# Patient Record
Sex: FEMALE | Race: WHITE | NOT HISPANIC OR LATINO | URBAN - METROPOLITAN AREA
[De-identification: names, ages, dates, MRNs, and addresses within clinical notes are randomized per-mention and may not be internally consistent; named-entity substitution may affect disease eponyms.]

---

## 2017-01-04 ENCOUNTER — OFFICE (OUTPATIENT)
Dept: URBAN - METROPOLITAN AREA CLINIC 11 | Facility: CLINIC | Age: 67
End: 2017-01-04
Payer: COMMERCIAL

## 2017-01-04 VITALS
DIASTOLIC BLOOD PRESSURE: 76 MMHG | HEART RATE: 83 BPM | RESPIRATION RATE: 16 BRPM | HEIGHT: 67 IN | WEIGHT: 281 LBS | SYSTOLIC BLOOD PRESSURE: 170 MMHG

## 2017-01-04 DIAGNOSIS — K76.0 FATTY (CHANGE OF) LIVER, NOT ELSEWHERE CLASSIFIED: ICD-10-CM

## 2017-01-04 DIAGNOSIS — K58.9 IRRITABLE BOWEL SYNDROME WITHOUT DIARRHEA: ICD-10-CM

## 2017-01-04 PROCEDURE — 99214 OFFICE O/P EST MOD 30 MIN: CPT

## 2017-01-04 PROCEDURE — G8427 DOCREV CUR MEDS BY ELIG CLIN: HCPCS

## 2017-01-04 RX ORDER — DICYCLOMINE HYDROCHLORIDE 10 MG/1
CAPSULE ORAL
Qty: 90 | Refills: 3 | Status: COMPLETED
End: 2021-06-07

## 2017-01-08 LAB
BASIC METABOLIC PANEL: CALCIUM TOTAL: 9.4 MG/DL (ref 8.5–10.5)
BASIC METABOLIC PANEL: CARBON DIOXIDE: 31 MEQ/L (ref 21–31)
BASIC METABOLIC PANEL: CHLORIDE: 101 MEQ/L (ref 96–106)
BASIC METABOLIC PANEL: CREATININE: 0.57 MG/DL — LOW (ref 0.6–1.3)
BASIC METABOLIC PANEL: GLUCOSE: 103 MG/DL — HIGH (ref 65–100)
BASIC METABOLIC PANEL: POTASSIUM: 4.3 MEQ/ML (ref 3.5–5.4)
BASIC METABOLIC PANEL: SODIUM: 143 MEQ/L (ref 135–145)
BASIC METABOLIC PANEL: UREA NITROGEN: 8 MG/DL (ref 8–23)
CBCI COMPLETE BLOOD COUNT W/ DIFF: ABS BASOPHILS: 0 K/UL (ref 0–0.3)
CBCI COMPLETE BLOOD COUNT W/ DIFF: ABS EOSINOPHILS: 0.2 K/UL (ref 0.05–0.5)
CBCI COMPLETE BLOOD COUNT W/ DIFF: ABS LYMPHOCYTES: 1.7 K/UL (ref 1–4)
CBCI COMPLETE BLOOD COUNT W/ DIFF: ABS MONOCYTES: 0.4 K/UL (ref 0.1–1.1)
CBCI COMPLETE BLOOD COUNT W/ DIFF: ABS NEUTROPHILS: 3.6 K/UL (ref 1.8–7)
CBCI COMPLETE BLOOD COUNT W/ DIFF: BASOPHILS: 0.2 % (ref 0–3)
CBCI COMPLETE BLOOD COUNT W/ DIFF: EOSINOPHILS: 2.9 % (ref 1–7)
CBCI COMPLETE BLOOD COUNT W/ DIFF: HEMATOCRIT: 43 % (ref 36–48)
CBCI COMPLETE BLOOD COUNT W/ DIFF: HEMOGLOBIN: 14.1 G/DL (ref 12–16)
CBCI COMPLETE BLOOD COUNT W/ DIFF: LYMPHOCYTES: 29.2 % (ref 20–48)
CBCI COMPLETE BLOOD COUNT W/ DIFF: MCH: 28.8 PG (ref 25–35)
CBCI COMPLETE BLOOD COUNT W/ DIFF: MCHC: 32.8 % (ref 30–38)
CBCI COMPLETE BLOOD COUNT W/ DIFF: MCV: 87.8 FL (ref 78–102)
CBCI COMPLETE BLOOD COUNT W/ DIFF: MONOCYTES: 7 % (ref 3–14)
CBCI COMPLETE BLOOD COUNT W/ DIFF: NEUTROPHILS: 60.7 % (ref 40–70)
CBCI COMPLETE BLOOD COUNT W/ DIFF: PLATELET COUNT: 213 K/UL (ref 150–450)
CBCI COMPLETE BLOOD COUNT W/ DIFF: RBC DISTRIBUTION WIDTH: 13.4 % (ref 11.5–16)
CBCI COMPLETE BLOOD COUNT W/ DIFF: RED BLOOD CELL COUNT: 4.9 M/UL (ref 4–5.5)
CBCI COMPLETE BLOOD COUNT W/ DIFF: WHITE BLOOD CELL COUNT: 5.9 K/UL (ref 4–11)
CERULOPLASMIN: CERULOPLASMIN RESULT: 24.2 MG/DL (ref 16–45)
COPPER, SERUM OR PLASMA: COPPER,SERUM: 1037 MCG/L (ref 810–1990)
FLUORESCENT ANTINUCLEAR ANTIBODY: ANTINUCLEAR ANTIBODY: POSITIVE
FLUORESCENT ANTINUCLEAR ANTIBODY: PATTERN/TITER: (no result)
GAMMA GLUTAMYL TRANSFERASE: GGT: 28 U/L (ref 9–64)
HEMOGLOBIN A1C: 6.5 % — HIGH (ref 4.3–5.6)
HEMOGLOBIN A1C: ESTIMATED AVG GLUCOSE: 139.9 MG/DL
HEPATIC FUNCTION PANEL A: ALBUMIN: 4.7 G/DL (ref 3.5–5.2)
HEPATIC FUNCTION PANEL A: ALKALINE PHOSPHATASE: 85 U/L (ref 34–115)
HEPATIC FUNCTION PANEL A: DIRECT BILIRUBIN: 0.1 MG/DL (ref 0–0.2)
HEPATIC FUNCTION PANEL A: SGOT (AST): 32 U/L (ref 13–40)
HEPATIC FUNCTION PANEL A: SGPT (ALT): 47 U/L (ref 7–52)
HEPATIC FUNCTION PANEL A: TOTAL BILIRUBIN: 0.5 MG/DL (ref 0.3–1.2)
HEPATIC FUNCTION PANEL A: TOTAL PROTEIN: 6.7 G/DL (ref 6.4–8.3)
HEPATITIS C VIRUS ANTIBODY: HEP C VIRUS AB, 3RD GEN: NEGATIVE
INSULIN: 26.8 UU/ML — HIGH (ref 2.6–24.9)
LIPID PROFILE: CHOLESTEROL: 201 MG/DL — HIGH
LIPID PROFILE: CORONARY RISK RATIO: 2.83
LIPID PROFILE: HDL CHOLESTEROL: 71 MG/DL
LIPID PROFILE: LDL CHOLESTEROL: 108 MG/DL
LIPID PROFILE: NON-HDL CHOLESTEROL: 130 MG/DL
LIPID PROFILE: TRIGLYCERIDES: 110 MG/DL

## 2017-04-05 ENCOUNTER — OFFICE (OUTPATIENT)
Dept: URBAN - METROPOLITAN AREA CLINIC 11 | Facility: CLINIC | Age: 67
End: 2017-04-05
Payer: MEDICARE

## 2017-04-05 VITALS
RESPIRATION RATE: 16 BRPM | WEIGHT: 277 LBS | HEART RATE: 66 BPM | HEIGHT: 67 IN | SYSTOLIC BLOOD PRESSURE: 181 MMHG | DIASTOLIC BLOOD PRESSURE: 76 MMHG

## 2017-04-05 DIAGNOSIS — K58.9 IRRITABLE BOWEL SYNDROME WITHOUT DIARRHEA: ICD-10-CM

## 2017-04-05 DIAGNOSIS — K76.0 FATTY (CHANGE OF) LIVER, NOT ELSEWHERE CLASSIFIED: ICD-10-CM

## 2017-04-05 PROCEDURE — G8427 DOCREV CUR MEDS BY ELIG CLIN: HCPCS

## 2017-04-05 PROCEDURE — 99213 OFFICE O/P EST LOW 20 MIN: CPT

## 2017-04-09 LAB
BMP WITH ESTIMATED GFR,CALCULATED: CALCIUM TOTAL: 9.8 MG/DL (ref 8.5–10.5)
BMP WITH ESTIMATED GFR,CALCULATED: CARBON DIOXIDE: 20 MEQ/L — LOW (ref 21–31)
BMP WITH ESTIMATED GFR,CALCULATED: CHLORIDE: 102 MEQ/L (ref 96–106)
BMP WITH ESTIMATED GFR,CALCULATED: CREATININE: 0.71 MG/DL (ref 0.6–1.3)
BMP WITH ESTIMATED GFR,CALCULATED: EGFR AFRICAN AMERICAN CALC: 101 ML/MIN/1.73M'2
BMP WITH ESTIMATED GFR,CALCULATED: EGFR NON-AFRICAN AMERICAN CALC: 88 ML/MIN/1.73M'2
BMP WITH ESTIMATED GFR,CALCULATED: FASTING/NON-FASTING: (no result)
BMP WITH ESTIMATED GFR,CALCULATED: GLUCOSE: 115 MG/DL — HIGH (ref 65–100)
BMP WITH ESTIMATED GFR,CALCULATED: POTASSIUM: 4.7 MEQ/ML (ref 3.5–5.4)
BMP WITH ESTIMATED GFR,CALCULATED: SODIUM: 148 MEQ/L — HIGH (ref 135–145)
BMP WITH ESTIMATED GFR,CALCULATED: UREA NITROGEN: 7 MG/DL — LOW (ref 8–23)
CBCI COMPLETE BLOOD COUNT W/ DIFF: ABS BASOPHILS: 0 K/UL (ref 0–0.3)
CBCI COMPLETE BLOOD COUNT W/ DIFF: ABS EOSINOPHILS: 0.2 K/UL (ref 0.05–0.5)
CBCI COMPLETE BLOOD COUNT W/ DIFF: ABS LYMPHOCYTES: 1.6 K/UL (ref 1–4)
CBCI COMPLETE BLOOD COUNT W/ DIFF: ABS MONOCYTES: 0.4 K/UL (ref 0.1–1.1)
CBCI COMPLETE BLOOD COUNT W/ DIFF: ABS NEUTROPHILS: 3.4 K/UL (ref 1.8–7)
CBCI COMPLETE BLOOD COUNT W/ DIFF: BASOPHILS: 0.2 % (ref 0–3)
CBCI COMPLETE BLOOD COUNT W/ DIFF: EOSINOPHILS: 3.6 % (ref 1–7)
CBCI COMPLETE BLOOD COUNT W/ DIFF: HEMATOCRIT: 42.3 % (ref 36–48)
CBCI COMPLETE BLOOD COUNT W/ DIFF: HEMOGLOBIN: 13.8 G/DL (ref 12–16)
CBCI COMPLETE BLOOD COUNT W/ DIFF: LYMPHOCYTES: 29 % (ref 20–48)
CBCI COMPLETE BLOOD COUNT W/ DIFF: MCH: 28.4 PG (ref 25–35)
CBCI COMPLETE BLOOD COUNT W/ DIFF: MCHC: 32.6 % (ref 30–38)
CBCI COMPLETE BLOOD COUNT W/ DIFF: MCV: 87 FL (ref 78–102)
CBCI COMPLETE BLOOD COUNT W/ DIFF: MONOCYTES: 7.3 % (ref 3–14)
CBCI COMPLETE BLOOD COUNT W/ DIFF: NEUTROPHILS: 59.9 % (ref 40–70)
CBCI COMPLETE BLOOD COUNT W/ DIFF: PLATELET COUNT: 237 K/UL (ref 150–450)
CBCI COMPLETE BLOOD COUNT W/ DIFF: RBC DISTRIBUTION WIDTH: 13.6 % (ref 11.5–16)
CBCI COMPLETE BLOOD COUNT W/ DIFF: RED BLOOD CELL COUNT: 4.86 M/UL (ref 4–5.5)
CBCI COMPLETE BLOOD COUNT W/ DIFF: WHITE BLOOD CELL COUNT: 5.6 K/UL (ref 4–11)
GAMMA GLUTAMYL TRANSFERASE: GGT: 23 U/L (ref 9–64)
HEMOGLOBIN A1C: 6.4 % — HIGH (ref 4.3–5.6)
HEMOGLOBIN A1C: ESTIMATED AVG GLUCOSE: 137 MG/DL
HEPATIC FUNCTION PANEL A: ALBUMIN: 4.8 G/DL (ref 3.5–5.2)
HEPATIC FUNCTION PANEL A: ALKALINE PHOSPHATASE: 84 U/L (ref 38–146)
HEPATIC FUNCTION PANEL A: DIRECT BILIRUBIN: <0.1 MG/DL
HEPATIC FUNCTION PANEL A: SGOT (AST): 21 U/L (ref 13–40)
HEPATIC FUNCTION PANEL A: SGPT (ALT): 28 U/L (ref 7–52)
HEPATIC FUNCTION PANEL A: TOTAL BILIRUBIN: 0.4 MG/DL (ref 0.3–1.2)
HEPATIC FUNCTION PANEL A: TOTAL PROTEIN: 7.7 G/DL (ref 6.4–8.3)
INSULIN: 26.6 UU/ML — HIGH (ref 2.6–24.9)
PROTHROMBIN TIME: INR VALUE: 1.03
PROTHROMBIN TIME: MEAN NORMAL: 13.1 SECONDS
PROTHROMBIN TIME: PATIENT: 13.4 SECONDS (ref 12.1–14.2)

## 2017-05-03 ENCOUNTER — OFFICE (OUTPATIENT)
Dept: URBAN - METROPOLITAN AREA CLINIC 14 | Facility: CLINIC | Age: 67
End: 2017-05-03

## 2017-05-03 DIAGNOSIS — K76.0 FATTY (CHANGE OF) LIVER, NOT ELSEWHERE CLASSIFIED: ICD-10-CM

## 2017-05-03 PROCEDURE — 91200 LIVER ELASTOGRAPHY: CPT

## 2017-10-09 ENCOUNTER — OFFICE (OUTPATIENT)
Dept: URBAN - METROPOLITAN AREA CLINIC 11 | Facility: CLINIC | Age: 67
End: 2017-10-09
Payer: MEDICARE

## 2017-10-09 VITALS
HEIGHT: 67 IN | HEART RATE: 76 BPM | WEIGHT: 263 LBS | SYSTOLIC BLOOD PRESSURE: 160 MMHG | RESPIRATION RATE: 16 BRPM | DIASTOLIC BLOOD PRESSURE: 90 MMHG

## 2017-10-09 DIAGNOSIS — K76.0 FATTY (CHANGE OF) LIVER, NOT ELSEWHERE CLASSIFIED: ICD-10-CM

## 2017-10-09 DIAGNOSIS — K31.84 GASTROPARESIS: ICD-10-CM

## 2017-10-09 DIAGNOSIS — K58.9 IRRITABLE BOWEL SYNDROME WITHOUT DIARRHEA: ICD-10-CM

## 2017-10-09 LAB
BASIC METABOLIC PANEL (8): BUN/CREATININE RATIO: 14 (ref 12–28)
BASIC METABOLIC PANEL (8): BUN: 9 MG/DL (ref 8–27)
BASIC METABOLIC PANEL (8): CALCIUM, SERUM: 10.1 MG/DL (ref 8.7–10.3)
BASIC METABOLIC PANEL (8): CARBON DIOXIDE, TOTAL: 24 MMOL/L (ref 18–29)
BASIC METABOLIC PANEL (8): CHLORIDE, SERUM: 98 MMOL/L (ref 96–106)
BASIC METABOLIC PANEL (8): CREATININE, SERUM: 0.66 MG/DL (ref 0.57–1)
BASIC METABOLIC PANEL (8): EGFR IF AFRICN AM: 104 ML/MIN/1.73 (ref 59–?)
BASIC METABOLIC PANEL (8): EGFR IF NONAFRICN AM: 90 ML/MIN/1.73 (ref 59–?)
BASIC METABOLIC PANEL (8): GLUCOSE, SERUM: 105 MG/DL — HIGH (ref 65–99)
BASIC METABOLIC PANEL (8): POTASSIUM, SERUM: 4.4 MMOL/L (ref 3.5–5.2)
BASIC METABOLIC PANEL (8): SODIUM, SERUM: 142 MMOL/L (ref 134–144)
CBC WITH DIFFERENTIAL/PLATELET: BASO (ABSOLUTE): 0 X10E3/UL (ref 0–0.2)
CBC WITH DIFFERENTIAL/PLATELET: BASOS: 0 %
CBC WITH DIFFERENTIAL/PLATELET: EOS (ABSOLUTE): 0.1 X10E3/UL (ref 0–0.4)
CBC WITH DIFFERENTIAL/PLATELET: EOS: 2 %
CBC WITH DIFFERENTIAL/PLATELET: HEMATOCRIT: 43.5 % (ref 34–46.6)
CBC WITH DIFFERENTIAL/PLATELET: HEMOGLOBIN: 14.9 G/DL (ref 11.1–15.9)
CBC WITH DIFFERENTIAL/PLATELET: IMMATURE GRANS (ABS): 0 X10E3/UL (ref 0–0.1)
CBC WITH DIFFERENTIAL/PLATELET: IMMATURE GRANULOCYTES: 0 %
CBC WITH DIFFERENTIAL/PLATELET: LYMPHS (ABSOLUTE): 1.4 X10E3/UL (ref 0.7–3.1)
CBC WITH DIFFERENTIAL/PLATELET: LYMPHS: 26 %
CBC WITH DIFFERENTIAL/PLATELET: MCH: 29.4 PG (ref 26.6–33)
CBC WITH DIFFERENTIAL/PLATELET: MCHC: 34.3 G/DL (ref 31.5–35.7)
CBC WITH DIFFERENTIAL/PLATELET: MCV: 86 FL (ref 79–97)
CBC WITH DIFFERENTIAL/PLATELET: MONOCYTES(ABSOLUTE): 0.4 X10E3/UL (ref 0.1–0.9)
CBC WITH DIFFERENTIAL/PLATELET: MONOCYTES: 6 %
CBC WITH DIFFERENTIAL/PLATELET: NEUTROPHILS (ABSOLUTE): 3.5 X10E3/UL (ref 1.4–7)
CBC WITH DIFFERENTIAL/PLATELET: NEUTROPHILS: 66 %
CBC WITH DIFFERENTIAL/PLATELET: PLATELETS: 233 X10E3/UL (ref 150–379)
CBC WITH DIFFERENTIAL/PLATELET: RBC: 5.07 X10E6/UL (ref 3.77–5.28)
CBC WITH DIFFERENTIAL/PLATELET: RDW: 13.6 % (ref 12.3–15.4)
CBC WITH DIFFERENTIAL/PLATELET: WBC: 5.5 X10E3/UL (ref 3.4–10.8)
GGT: 24 IU/L (ref 0–60)
HEMOGLOBIN A1C: 6.4 % — HIGH (ref 4.8–5.6)
HEPATIC FUNCTION PANEL (7): ALBUMIN, SERUM: 4.9 G/DL — HIGH (ref 3.6–4.8)
HEPATIC FUNCTION PANEL (7): ALKALINE PHOSPHATASE, S: 86 IU/L (ref 39–117)
HEPATIC FUNCTION PANEL (7): ALT (SGPT): 33 IU/L — HIGH (ref 0–32)
HEPATIC FUNCTION PANEL (7): AST (SGOT): 26 IU/L (ref 0–40)
HEPATIC FUNCTION PANEL (7): BILIRUBIN, DIRECT: 0.14 MG/DL (ref 0–0.4)
HEPATIC FUNCTION PANEL (7): BILIRUBIN, TOTAL: 0.5 MG/DL (ref 0–1.2)
HEPATIC FUNCTION PANEL (7): PROTEIN, TOTAL, SERUM: 7.4 G/DL (ref 6–8.5)
INSULIN: 26.4 UIU/ML — HIGH (ref 2.6–24.9)
NASH FIBROSURE: ALPHA 2-MACROGLOBULINS, QN: 324 MG/DL — HIGH (ref 110–276)
NASH FIBROSURE: ALT (SGPT) P5P: 39 IU/L (ref 0–40)
NASH FIBROSURE: APOLIPOPROTEIN A-1: 178 MG/DL (ref 116–209)
NASH FIBROSURE: AST (SGOT) P5P: 28 IU/L (ref 0–40)
NASH FIBROSURE: BILIRUBIN, TOTAL: 0.2 MG/DL (ref 0–1.2)
NASH FIBROSURE: CHOLESTEROL, TOTAL: 218 MG/DL — HIGH (ref 100–199)
NASH FIBROSURE: COMMENT: (no result)
NASH FIBROSURE: FIBROSIS SCORE: 0.18 (ref 0–0.21)
NASH FIBROSURE: FIBROSIS SCORING: (no result)
NASH FIBROSURE: FIBROSIS STAGE: (no result)
NASH FIBROSURE: GGT: 23 IU/L (ref 0–60)
NASH FIBROSURE: GLUCOSE, SERUM: 108 MG/DL — HIGH (ref 65–99)
NASH FIBROSURE: HAPTOGLOBIN: 196 MG/DL (ref 34–200)
NASH FIBROSURE: HEIGHT: 67 INCHES
NASH FIBROSURE: INTERPRETATIONS: (no result)
NASH FIBROSURE: LIMITATIONS: (no result)
NASH FIBROSURE: NASH GRADE: (no result)
NASH FIBROSURE: NASH SCORE: 0.5 (ref 0.25–?)
NASH FIBROSURE: NASH SCORING: (no result)
NASH FIBROSURE: STEATOSIS GRADE: (no result)
NASH FIBROSURE: STEATOSIS GRADING: (no result)
NASH FIBROSURE: STEATOSIS SCORE: 0.79 — HIGH (ref 0–0.3)
NASH FIBROSURE: TRIGLYCERIDES: 130 MG/DL (ref 0–149)
NASH FIBROSURE: WEIGHT: 263 LBS

## 2017-10-09 PROCEDURE — 99213 OFFICE O/P EST LOW 20 MIN: CPT

## 2017-10-09 PROCEDURE — G8427 DOCREV CUR MEDS BY ELIG CLIN: HCPCS

## 2019-01-09 ENCOUNTER — OFFICE (OUTPATIENT)
Dept: URBAN - METROPOLITAN AREA CLINIC 11 | Facility: CLINIC | Age: 69
End: 2019-01-09

## 2019-01-09 VITALS
DIASTOLIC BLOOD PRESSURE: 77 MMHG | SYSTOLIC BLOOD PRESSURE: 188 MMHG | HEART RATE: 67 BPM | DIASTOLIC BLOOD PRESSURE: 78 MMHG | HEIGHT: 67 IN | SYSTOLIC BLOOD PRESSURE: 181 MMHG | WEIGHT: 264 LBS

## 2019-01-09 DIAGNOSIS — K31.84 GASTROPARESIS: ICD-10-CM

## 2019-01-09 DIAGNOSIS — K58.9 IRRITABLE BOWEL SYNDROME WITHOUT DIARRHEA: ICD-10-CM

## 2019-01-09 DIAGNOSIS — K76.0 FATTY (CHANGE OF) LIVER, NOT ELSEWHERE CLASSIFIED: ICD-10-CM

## 2019-01-09 LAB
CBC, PLATELET, NO DIFFERENTIAL: HEMATOCRIT: 40.3 % (ref 34–46.6)
CBC, PLATELET, NO DIFFERENTIAL: HEMOGLOBIN: 13.4 G/DL (ref 11.1–15.9)
CBC, PLATELET, NO DIFFERENTIAL: MCH: 28.9 PG (ref 26.6–33)
CBC, PLATELET, NO DIFFERENTIAL: MCHC: 33.3 G/DL (ref 31.5–35.7)
CBC, PLATELET, NO DIFFERENTIAL: MCV: 87 FL (ref 79–97)
CBC, PLATELET, NO DIFFERENTIAL: PLATELETS: 245 X10E3/UL (ref 150–379)
CBC, PLATELET, NO DIFFERENTIAL: RBC: 4.63 X10E6/UL (ref 3.77–5.28)
CBC, PLATELET, NO DIFFERENTIAL: RDW: 14.7 % (ref 12.3–15.4)
CBC, PLATELET, NO DIFFERENTIAL: WBC: 4.4 X10E3/UL (ref 3.4–10.8)
HEPATIC FUNCTION PANEL (7): ALBUMIN: 4.6 G/DL (ref 3.5–4.8)
HEPATIC FUNCTION PANEL (7): ALKALINE PHOSPHATASE: 79 IU/L (ref 39–117)
HEPATIC FUNCTION PANEL (7): ALT (SGPT): 47 IU/L — HIGH (ref 0–32)
HEPATIC FUNCTION PANEL (7): AST (SGOT): 33 IU/L (ref 0–40)
HEPATIC FUNCTION PANEL (7): BILIRUBIN, DIRECT: 0.11 MG/DL (ref 0–0.4)
HEPATIC FUNCTION PANEL (7): BILIRUBIN, TOTAL: 0.4 MG/DL (ref 0–1.2)
HEPATIC FUNCTION PANEL (7): PROTEIN, TOTAL: 7.3 G/DL (ref 6–8.5)

## 2019-01-09 PROCEDURE — 99213 OFFICE O/P EST LOW 20 MIN: CPT | Performed by: INTERNAL MEDICINE

## 2019-01-09 RX ORDER — DICYCLOMINE HYDROCHLORIDE 10 MG/1
CAPSULE ORAL
Qty: 90 | Refills: 3 | Status: COMPLETED
End: 2021-06-07

## 2020-01-15 ENCOUNTER — OFFICE (OUTPATIENT)
Dept: URBAN - METROPOLITAN AREA CLINIC 11 | Facility: CLINIC | Age: 70
End: 2020-01-15

## 2020-01-15 VITALS
WEIGHT: 250 LBS | DIASTOLIC BLOOD PRESSURE: 64 MMHG | HEIGHT: 67 IN | SYSTOLIC BLOOD PRESSURE: 174 MMHG | HEART RATE: 72 BPM

## 2020-01-15 DIAGNOSIS — K76.0 FATTY (CHANGE OF) LIVER, NOT ELSEWHERE CLASSIFIED: ICD-10-CM

## 2020-01-15 DIAGNOSIS — K58.9 IRRITABLE BOWEL SYNDROME WITHOUT DIARRHEA: ICD-10-CM

## 2020-01-15 LAB
HEPATIC FUNCTION PANEL (7): ALBUMIN: 4.5 G/DL (ref 3.5–4.8)
HEPATIC FUNCTION PANEL (7): ALKALINE PHOSPHATASE: 82 IU/L (ref 39–117)
HEPATIC FUNCTION PANEL (7): ALT (SGPT): 34 IU/L — HIGH (ref 0–32)
HEPATIC FUNCTION PANEL (7): AST (SGOT): 37 IU/L (ref 0–40)
HEPATIC FUNCTION PANEL (7): BILIRUBIN, DIRECT: 0.1 MG/DL (ref 0–0.4)
HEPATIC FUNCTION PANEL (7): BILIRUBIN, TOTAL: 0.3 MG/DL (ref 0–1.2)
HEPATIC FUNCTION PANEL (7): PROTEIN, TOTAL: 7.1 G/DL (ref 6–8.5)
NASH FIBROSURE: ALPHA 2-MACROGLOBULINS, QN: 330 MG/DL — HIGH (ref 110–276)
NASH FIBROSURE: ALT (SGPT) P5P: 40 IU/L (ref 0–40)
NASH FIBROSURE: APOLIPOPROTEIN A-1: 174 MG/DL (ref 116–209)
NASH FIBROSURE: AST (SGOT) P5P: 44 IU/L — HIGH (ref 0–40)
NASH FIBROSURE: BILIRUBIN, TOTAL: 0.3 MG/DL (ref 0–1.2)
NASH FIBROSURE: CHOLESTEROL, TOTAL: 197 MG/DL (ref 100–199)
NASH FIBROSURE: COMMENT: (no result)
NASH FIBROSURE: FIBROSIS SCORE: 0.25 — HIGH (ref 0–0.21)
NASH FIBROSURE: FIBROSIS SCORING: (no result)
NASH FIBROSURE: FIBROSIS STAGE: (no result)
NASH FIBROSURE: GGT: 21 IU/L (ref 0–60)
NASH FIBROSURE: GLUCOSE, SERUM: 124 MG/DL — HIGH (ref 65–99)
NASH FIBROSURE: HAPTOGLOBIN: 191 MG/DL (ref 42–346)
NASH FIBROSURE: HEIGHT: 67 IN
NASH FIBROSURE: INTERPRETATIONS: (no result)
NASH FIBROSURE: LIMITATIONS: (no result)
NASH FIBROSURE: NASH GRADE: (no result)
NASH FIBROSURE: NASH SCORE: 0.5 — HIGH (ref 0.25–?)
NASH FIBROSURE: NASH SCORING: (no result)
NASH FIBROSURE: STEATOSIS GRADE: (no result)
NASH FIBROSURE: STEATOSIS GRADING: (no result)
NASH FIBROSURE: STEATOSIS SCORE: 0.51 — HIGH (ref 0–0.3)
NASH FIBROSURE: TRIGLYCERIDES: 122 MG/DL (ref 0–149)
NASH FIBROSURE: WEIGHT: 250 LBS

## 2020-01-15 PROCEDURE — 99213 OFFICE O/P EST LOW 20 MIN: CPT | Performed by: INTERNAL MEDICINE

## 2021-06-07 ENCOUNTER — OFFICE (OUTPATIENT)
Dept: URBAN - METROPOLITAN AREA CLINIC 11 | Facility: CLINIC | Age: 71
End: 2021-06-07

## 2021-06-07 VITALS
WEIGHT: 240 LBS | HEART RATE: 76 BPM | SYSTOLIC BLOOD PRESSURE: 225 MMHG | OXYGEN SATURATION: 98 % | HEIGHT: 67 IN | DIASTOLIC BLOOD PRESSURE: 69 MMHG

## 2021-06-07 DIAGNOSIS — K21.9 GASTRO-ESOPHAGEAL REFLUX DISEASE WITHOUT ESOPHAGITIS: ICD-10-CM

## 2021-06-07 DIAGNOSIS — R11.0 NAUSEA: ICD-10-CM

## 2021-06-07 DIAGNOSIS — K76.0 FATTY (CHANGE OF) LIVER, NOT ELSEWHERE CLASSIFIED: ICD-10-CM

## 2021-06-07 PROCEDURE — 99214 OFFICE O/P EST MOD 30 MIN: CPT | Performed by: INTERNAL MEDICINE

## 2021-06-07 RX ORDER — SODIUM PICOSULFATE, MAGNESIUM OXIDE, AND ANHYDROUS CITRIC ACID 10; 3.5; 12 MG/160ML; G/160ML; G/160ML
LIQUID ORAL
Qty: 1 | Refills: 0 | Status: COMPLETED
Start: 2021-06-07 | End: 2021-07-20

## 2021-07-20 ENCOUNTER — AMBULATORY SURGICAL CENTER (OUTPATIENT)
Dept: URBAN - METROPOLITAN AREA SURGERY 3 | Facility: SURGERY | Age: 71
End: 2021-07-20
Payer: COMMERCIAL

## 2021-07-20 ENCOUNTER — AMBULATORY SURGICAL CENTER (OUTPATIENT)
Dept: URBAN - METROPOLITAN AREA SURGERY 3 | Facility: SURGERY | Age: 71
End: 2021-07-20

## 2021-07-20 ENCOUNTER — OFFICE (OUTPATIENT)
Dept: URBAN - METROPOLITAN AREA PATHOLOGY 22 | Facility: PATHOLOGY | Age: 71
End: 2021-07-20

## 2021-07-20 VITALS
SYSTOLIC BLOOD PRESSURE: 154 MMHG | HEART RATE: 53 BPM | SYSTOLIC BLOOD PRESSURE: 141 MMHG | RESPIRATION RATE: 18 BRPM | WEIGHT: 234 LBS | OXYGEN SATURATION: 97 % | OXYGEN SATURATION: 97 % | SYSTOLIC BLOOD PRESSURE: 144 MMHG | WEIGHT: 234 LBS | HEART RATE: 58 BPM | DIASTOLIC BLOOD PRESSURE: 88 MMHG | RESPIRATION RATE: 20 BRPM | SYSTOLIC BLOOD PRESSURE: 141 MMHG | HEART RATE: 53 BPM | RESPIRATION RATE: 20 BRPM | HEIGHT: 67 IN | DIASTOLIC BLOOD PRESSURE: 64 MMHG | HEIGHT: 67 IN | OXYGEN SATURATION: 98 % | RESPIRATION RATE: 18 BRPM | DIASTOLIC BLOOD PRESSURE: 69 MMHG | HEART RATE: 59 BPM | DIASTOLIC BLOOD PRESSURE: 64 MMHG | DIASTOLIC BLOOD PRESSURE: 52 MMHG | HEART RATE: 59 BPM | WEIGHT: 234 LBS | HEART RATE: 51 BPM | SYSTOLIC BLOOD PRESSURE: 106 MMHG | HEIGHT: 67 IN | DIASTOLIC BLOOD PRESSURE: 52 MMHG | TEMPERATURE: 97.8 F | DIASTOLIC BLOOD PRESSURE: 88 MMHG | OXYGEN SATURATION: 98 % | OXYGEN SATURATION: 93 % | TEMPERATURE: 97.2 F | HEART RATE: 54 BPM | DIASTOLIC BLOOD PRESSURE: 69 MMHG | OXYGEN SATURATION: 98 % | HEART RATE: 58 BPM | HEART RATE: 54 BPM | SYSTOLIC BLOOD PRESSURE: 154 MMHG | OXYGEN SATURATION: 96 % | OXYGEN SATURATION: 96 % | DIASTOLIC BLOOD PRESSURE: 52 MMHG | SYSTOLIC BLOOD PRESSURE: 141 MMHG | RESPIRATION RATE: 18 BRPM | DIASTOLIC BLOOD PRESSURE: 64 MMHG | RESPIRATION RATE: 16 BRPM | SYSTOLIC BLOOD PRESSURE: 106 MMHG | TEMPERATURE: 97.2 F | SYSTOLIC BLOOD PRESSURE: 144 MMHG | DIASTOLIC BLOOD PRESSURE: 88 MMHG | DIASTOLIC BLOOD PRESSURE: 69 MMHG | HEART RATE: 58 BPM | TEMPERATURE: 97.2 F | SYSTOLIC BLOOD PRESSURE: 144 MMHG | RESPIRATION RATE: 20 BRPM | HEART RATE: 59 BPM | OXYGEN SATURATION: 93 % | TEMPERATURE: 97.8 F | OXYGEN SATURATION: 97 % | HEART RATE: 54 BPM | HEART RATE: 53 BPM | DIASTOLIC BLOOD PRESSURE: 65 MMHG | RESPIRATION RATE: 16 BRPM | SYSTOLIC BLOOD PRESSURE: 106 MMHG | RESPIRATION RATE: 16 BRPM | OXYGEN SATURATION: 93 % | DIASTOLIC BLOOD PRESSURE: 65 MMHG | TEMPERATURE: 97.8 F | SYSTOLIC BLOOD PRESSURE: 154 MMHG | DIASTOLIC BLOOD PRESSURE: 65 MMHG | HEART RATE: 51 BPM | OXYGEN SATURATION: 96 % | HEART RATE: 51 BPM

## 2021-07-20 DIAGNOSIS — D12.5 BENIGN NEOPLASM OF SIGMOID COLON: ICD-10-CM

## 2021-07-20 DIAGNOSIS — K31.89 OTHER DISEASES OF STOMACH AND DUODENUM: ICD-10-CM

## 2021-07-20 DIAGNOSIS — K57.30 DIVERTICULOSIS OF LARGE INTESTINE WITHOUT PERFORATION OR ABS: ICD-10-CM

## 2021-07-20 DIAGNOSIS — Z12.11 ENCOUNTER FOR SCREENING FOR MALIGNANT NEOPLASM OF COLON: ICD-10-CM

## 2021-07-20 DIAGNOSIS — D12.2 BENIGN NEOPLASM OF ASCENDING COLON: ICD-10-CM

## 2021-07-20 DIAGNOSIS — D12.8 BENIGN NEOPLASM OF RECTUM: ICD-10-CM

## 2021-07-20 DIAGNOSIS — K21.00 GASTRO-ESOPHAGEAL REFLUX DISEASE WITH ESOPHAGITIS, WITHOUT B: ICD-10-CM

## 2021-07-20 DIAGNOSIS — B37.81 CANDIDAL ESOPHAGITIS: ICD-10-CM

## 2021-07-20 DIAGNOSIS — D12.4 BENIGN NEOPLASM OF DESCENDING COLON: ICD-10-CM

## 2021-07-20 PROBLEM — K62.1 RECTAL POLYP: Status: ACTIVE | Noted: 2021-07-20

## 2021-07-20 PROBLEM — K22.8 OTHER SPECIFIED DISEASES OF ESOPHAGUS: Status: ACTIVE | Noted: 2021-07-20

## 2021-07-20 PROBLEM — K63.5 POLYP OF COLON: Status: ACTIVE | Noted: 2021-07-20

## 2021-07-20 PROCEDURE — 43239 EGD BIOPSY SINGLE/MULTIPLE: CPT | Mod: 51 | Performed by: INTERNAL MEDICINE

## 2021-07-20 PROCEDURE — G8918 PT W/O PREOP ORDER IV AB PRO: HCPCS | Performed by: INTERNAL MEDICINE

## 2021-07-20 PROCEDURE — 45385 COLONOSCOPY W/LESION REMOVAL: CPT | Mod: PT | Performed by: INTERNAL MEDICINE

## 2021-07-20 PROCEDURE — G8907 PT DOC NO EVENTS ON DISCHARG: HCPCS | Performed by: INTERNAL MEDICINE

## 2021-07-20 PROCEDURE — 88312 SPECIAL STAINS GROUP 1: CPT | Performed by: INTERNAL MEDICINE

## 2021-07-20 PROCEDURE — 88342 IMHCHEM/IMCYTCHM 1ST ANTB: CPT | Performed by: INTERNAL MEDICINE

## 2021-07-20 PROCEDURE — 88313 SPECIAL STAINS GROUP 2: CPT | Performed by: INTERNAL MEDICINE

## 2021-07-20 PROCEDURE — 88305 TISSUE EXAM BY PATHOLOGIST: CPT | Performed by: INTERNAL MEDICINE

## 2022-05-02 ENCOUNTER — OFFICE (OUTPATIENT)
Dept: URBAN - METROPOLITAN AREA CLINIC 11 | Facility: CLINIC | Age: 72
End: 2022-05-02

## 2022-05-02 VITALS
WEIGHT: 238 LBS | SYSTOLIC BLOOD PRESSURE: 162 MMHG | OXYGEN SATURATION: 97 % | HEIGHT: 67 IN | DIASTOLIC BLOOD PRESSURE: 76 MMHG

## 2022-05-02 DIAGNOSIS — K31.84 GASTROPARESIS: ICD-10-CM

## 2022-05-02 DIAGNOSIS — I48.91 UNSPECIFIED ATRIAL FIBRILLATION: ICD-10-CM

## 2022-05-02 DIAGNOSIS — K76.0 FATTY (CHANGE OF) LIVER, NOT ELSEWHERE CLASSIFIED: ICD-10-CM

## 2022-05-02 PROCEDURE — 99214 OFFICE O/P EST MOD 30 MIN: CPT | Performed by: INTERNAL MEDICINE

## 2022-06-20 ENCOUNTER — OFFICE (OUTPATIENT)
Dept: URBAN - METROPOLITAN AREA CLINIC 14 | Facility: CLINIC | Age: 72
End: 2022-06-20

## 2022-06-20 VITALS — HEIGHT: 67 IN

## 2022-06-20 DIAGNOSIS — K76.0 FATTY (CHANGE OF) LIVER, NOT ELSEWHERE CLASSIFIED: ICD-10-CM

## 2022-06-20 PROCEDURE — 91200 LIVER ELASTOGRAPHY: CPT | Performed by: INTERNAL MEDICINE

## 2023-05-12 ENCOUNTER — OFFICE (OUTPATIENT)
Dept: URBAN - METROPOLITAN AREA CLINIC 11 | Facility: CLINIC | Age: 73
End: 2023-05-12

## 2023-05-12 VITALS
HEIGHT: 67 IN | OXYGEN SATURATION: 96 % | WEIGHT: 223.2 LBS | DIASTOLIC BLOOD PRESSURE: 66 MMHG | HEART RATE: 56 BPM | SYSTOLIC BLOOD PRESSURE: 162 MMHG

## 2023-05-12 DIAGNOSIS — K76.0 FATTY (CHANGE OF) LIVER, NOT ELSEWHERE CLASSIFIED: ICD-10-CM

## 2023-05-12 DIAGNOSIS — I48.91 UNSPECIFIED ATRIAL FIBRILLATION: ICD-10-CM

## 2023-05-12 DIAGNOSIS — K31.84 GASTROPARESIS: ICD-10-CM

## 2023-05-12 DIAGNOSIS — Z86.010 PERSONAL HISTORY OF COLONIC POLYPS: ICD-10-CM

## 2023-05-12 PROCEDURE — 99214 OFFICE O/P EST MOD 30 MIN: CPT | Performed by: INTERNAL MEDICINE

## 2024-02-23 ENCOUNTER — OFFICE (OUTPATIENT)
Dept: URBAN - METROPOLITAN AREA CLINIC 11 | Facility: CLINIC | Age: 74
End: 2024-02-23

## 2024-02-23 VITALS
WEIGHT: 232 LBS | OXYGEN SATURATION: 98 % | DIASTOLIC BLOOD PRESSURE: 70 MMHG | HEART RATE: 80 BPM | SYSTOLIC BLOOD PRESSURE: 130 MMHG | HEIGHT: 67 IN

## 2024-02-23 DIAGNOSIS — K76.0 FATTY (CHANGE OF) LIVER, NOT ELSEWHERE CLASSIFIED: ICD-10-CM

## 2024-02-23 DIAGNOSIS — Z86.010 PERSONAL HISTORY OF COLONIC POLYPS: ICD-10-CM

## 2024-02-23 DIAGNOSIS — K14.6 GLOSSODYNIA: ICD-10-CM

## 2024-02-23 DIAGNOSIS — K31.84 GASTROPARESIS: ICD-10-CM

## 2024-02-23 DIAGNOSIS — K21.9 GASTRO-ESOPHAGEAL REFLUX DISEASE WITHOUT ESOPHAGITIS: ICD-10-CM

## 2024-02-23 PROCEDURE — 99214 OFFICE O/P EST MOD 30 MIN: CPT | Performed by: INTERNAL MEDICINE

## 2025-01-02 ENCOUNTER — OFFICE (OUTPATIENT)
Dept: URBAN - METROPOLITAN AREA CLINIC 11 | Facility: CLINIC | Age: 75
End: 2025-01-02
Payer: MEDICARE

## 2025-01-02 VITALS
SYSTOLIC BLOOD PRESSURE: 130 MMHG | HEART RATE: 63 BPM | WEIGHT: 234 LBS | OXYGEN SATURATION: 98 % | HEIGHT: 67 IN | DIASTOLIC BLOOD PRESSURE: 74 MMHG

## 2025-01-02 DIAGNOSIS — K30 FUNCTIONAL DYSPEPSIA: ICD-10-CM

## 2025-01-02 DIAGNOSIS — R11.0 NAUSEA: ICD-10-CM

## 2025-01-02 DIAGNOSIS — K31.84 GASTROPARESIS: ICD-10-CM

## 2025-01-02 DIAGNOSIS — K21.9 GASTRO-ESOPHAGEAL REFLUX DISEASE WITHOUT ESOPHAGITIS: ICD-10-CM

## 2025-01-02 DIAGNOSIS — R14.2 ERUCTATION: ICD-10-CM

## 2025-01-02 DIAGNOSIS — Z86.0100 PERSONAL HISTORY OF COLON POLYPS, UNSPECIFIED: ICD-10-CM

## 2025-01-02 DIAGNOSIS — K76.0 FATTY (CHANGE OF) LIVER, NOT ELSEWHERE CLASSIFIED: ICD-10-CM

## 2025-01-02 DIAGNOSIS — R10.10 UPPER ABDOMINAL PAIN, UNSPECIFIED: ICD-10-CM

## 2025-01-02 PROCEDURE — 99214 OFFICE O/P EST MOD 30 MIN: CPT | Performed by: STUDENT IN AN ORGANIZED HEALTH CARE EDUCATION/TRAINING PROGRAM

## 2025-01-02 RX ORDER — ONDANSETRON 4 MG/1
TABLET, ORALLY DISINTEGRATING ORAL
Qty: 30 | Refills: 1 | Status: ACTIVE
Start: 2025-01-02

## 2025-01-02 RX ORDER — SUCRALFATE 1 G/1
TABLET ORAL
Qty: 56 | Refills: 0 | Status: ACTIVE
Start: 2025-01-02

## 2025-01-02 RX ORDER — PANTOPRAZOLE SODIUM 40 MG/1
40 TABLET, DELAYED RELEASE ORAL
Qty: 30 | Refills: 3 | Status: ACTIVE
Start: 2025-01-02

## 2025-01-02 NOTE — SERVICEHPINOTES
Coleen Torres   is a   74   year old  female  with history of gastroparesis historically well controlled on domperidone as well as history of fatty liver with previous FibroScan consistent with F2 who presents complaining of worsening indigestion, upper abdominal pain, nausea, and sour stomach getting worse over the past few months.   She states she has continued to take her domperidone faithfully.  When her symptoms started initially she increased her dosage to 30 mg daily without much improvement.  She states 1st thing in the morning she has a feeling of a sour stomach, indigestion, and upper abdominal pain and burning.  She has taken Tums over-the-counter with some relief, but has not done this consistently.  She has also had a decreased appetite, and consequently has lost about 10 lb. She has not been able to identify and specifically triggering foods. She denies any significant dysphagia, coughing, hoarseness, sore throat, or vomiting.  She denies any fever.  She denies any lower abdominal pain, change in bowel habits, hematochezia, or melena.  She does have history of multiple joint surgeries, and historically took NSAIDs.  She does report she will occasionally take a 1000 mg of aspirin, about once a week.  She has not taken her meloxicam in over 6 months.  She has been taking tramadol as needed for musculoskeletal and abdominal pain.  She does state that this seems to feel different than her gastroparesis has in the past.  br
br   Her last EGD was in 2021 and showed white exudate in the middle third of the esophagus and erythema in the antrum and stomach body compatible with gastritis. She completed treatment for candida without any issues.br Her last colonoscopy was in 2021 and showed diverticulosis of the colon, multiple colon polyps, and internal hemorrhoids. She was placed on 3 year recall.

## 2025-01-02 NOTE — SERVICENOTES
Will obtain breath test and lab and proceed based on results. For persistent symptoms will proceed with EGD with surveillance colonoscopy. We will get these scheduled at her next visit. Advised to call for new or worsening symptoms. She verbalized understanding.

## 2025-01-03 LAB
CBC WITH DIFFERENTIAL/PLATELET: BASO (ABSOLUTE): 0 X10E3/UL (ref 0–0.2)
CBC WITH DIFFERENTIAL/PLATELET: BASOS: 1 %
CBC WITH DIFFERENTIAL/PLATELET: EOS (ABSOLUTE): 0.1 X10E3/UL (ref 0–0.4)
CBC WITH DIFFERENTIAL/PLATELET: EOS: 1 %
CBC WITH DIFFERENTIAL/PLATELET: HEMATOCRIT: 44.6 % (ref 34–46.6)
CBC WITH DIFFERENTIAL/PLATELET: HEMATOLOGY COMMENTS: (no result)
CBC WITH DIFFERENTIAL/PLATELET: HEMOGLOBIN: 14.5 G/DL (ref 11.1–15.9)
CBC WITH DIFFERENTIAL/PLATELET: IMMATURE CELLS: (no result)
CBC WITH DIFFERENTIAL/PLATELET: IMMATURE GRANS (ABS): 0 X10E3/UL (ref 0–0.1)
CBC WITH DIFFERENTIAL/PLATELET: IMMATURE GRANULOCYTES: 0 %
CBC WITH DIFFERENTIAL/PLATELET: LYMPHS (ABSOLUTE): 1.1 X10E3/UL (ref 0.7–3.1)
CBC WITH DIFFERENTIAL/PLATELET: LYMPHS: 17 %
CBC WITH DIFFERENTIAL/PLATELET: MCH: 29.5 PG (ref 26.6–33)
CBC WITH DIFFERENTIAL/PLATELET: MCHC: 32.5 G/DL (ref 31.5–35.7)
CBC WITH DIFFERENTIAL/PLATELET: MCV: 91 FL (ref 79–97)
CBC WITH DIFFERENTIAL/PLATELET: MONOCYTES(ABSOLUTE): 0.4 X10E3/UL (ref 0.1–0.9)
CBC WITH DIFFERENTIAL/PLATELET: MONOCYTES: 7 %
CBC WITH DIFFERENTIAL/PLATELET: NEUTROPHILS (ABSOLUTE): 4.6 X10E3/UL (ref 1.4–7)
CBC WITH DIFFERENTIAL/PLATELET: NEUTROPHILS: 74 %
CBC WITH DIFFERENTIAL/PLATELET: NRBC: (no result)
CBC WITH DIFFERENTIAL/PLATELET: PLATELETS: 260 X10E3/UL (ref 150–450)
CBC WITH DIFFERENTIAL/PLATELET: RBC: 4.91 X10E6/UL (ref 3.77–5.28)
CBC WITH DIFFERENTIAL/PLATELET: RDW: 12.2 % (ref 11.7–15.4)
CBC WITH DIFFERENTIAL/PLATELET: WBC: 6.2 X10E3/UL (ref 3.4–10.8)
COMP. METABOLIC PANEL (14): ALBUMIN: 4.5 G/DL (ref 3.8–4.8)
COMP. METABOLIC PANEL (14): ALKALINE PHOSPHATASE: 70 IU/L (ref 44–121)
COMP. METABOLIC PANEL (14): ALT (SGPT): 18 IU/L (ref 0–32)
COMP. METABOLIC PANEL (14): AST (SGOT): 18 IU/L (ref 0–40)
COMP. METABOLIC PANEL (14): BILIRUBIN, TOTAL: 0.4 MG/DL (ref 0–1.2)
COMP. METABOLIC PANEL (14): BUN/CREATININE RATIO: 8 — LOW (ref 12–28)
COMP. METABOLIC PANEL (14): BUN: 6 MG/DL — LOW (ref 8–27)
COMP. METABOLIC PANEL (14): CALCIUM: 9.1 MG/DL (ref 8.7–10.3)
COMP. METABOLIC PANEL (14): CARBON DIOXIDE, TOTAL: 26 MMOL/L (ref 20–29)
COMP. METABOLIC PANEL (14): CHLORIDE: 97 MMOL/L (ref 96–106)
COMP. METABOLIC PANEL (14): CREATININE: 0.71 MG/DL (ref 0.57–1)
COMP. METABOLIC PANEL (14): EGFR: 88 ML/MIN/1.73 (ref 59–?)
COMP. METABOLIC PANEL (14): GLOBULIN, TOTAL: 2.7 G/DL (ref 1.5–4.5)
COMP. METABOLIC PANEL (14): GLUCOSE: 105 MG/DL — HIGH (ref 70–99)
COMP. METABOLIC PANEL (14): POTASSIUM: 4.1 MMOL/L (ref 3.5–5.2)
COMP. METABOLIC PANEL (14): PROTEIN, TOTAL: 7.2 G/DL (ref 6–8.5)
COMP. METABOLIC PANEL (14): SODIUM: 134 MMOL/L (ref 134–144)

## 2025-01-19 LAB — H PYLORI BREATH TEST: NEGATIVE

## 2025-03-24 ENCOUNTER — OFFICE (OUTPATIENT)
Dept: URBAN - METROPOLITAN AREA CLINIC 11 | Facility: CLINIC | Age: 75
End: 2025-03-24
Payer: MEDICARE

## 2025-03-24 VITALS
WEIGHT: 241.4 LBS | OXYGEN SATURATION: 97 % | SYSTOLIC BLOOD PRESSURE: 130 MMHG | HEART RATE: 59 BPM | DIASTOLIC BLOOD PRESSURE: 75 MMHG | SYSTOLIC BLOOD PRESSURE: 174 MMHG | HEIGHT: 67 IN | DIASTOLIC BLOOD PRESSURE: 61 MMHG

## 2025-03-24 DIAGNOSIS — K21.9 GASTRO-ESOPHAGEAL REFLUX DISEASE WITHOUT ESOPHAGITIS: ICD-10-CM

## 2025-03-24 DIAGNOSIS — K31.84 GASTROPARESIS: ICD-10-CM

## 2025-03-24 DIAGNOSIS — Z86.0100 PERSONAL HISTORY OF COLON POLYPS, UNSPECIFIED: ICD-10-CM

## 2025-03-24 DIAGNOSIS — K76.0 FATTY (CHANGE OF) LIVER, NOT ELSEWHERE CLASSIFIED: ICD-10-CM

## 2025-03-24 PROCEDURE — 99214 OFFICE O/P EST MOD 30 MIN: CPT | Performed by: STUDENT IN AN ORGANIZED HEALTH CARE EDUCATION/TRAINING PROGRAM

## 2025-03-24 RX ORDER — FAMOTIDINE 20 MG/1
40 TABLET, FILM COATED ORAL
Qty: 180 | Refills: 3 | Status: ACTIVE
Start: 2025-01-03

## 2025-03-24 RX ORDER — PANTOPRAZOLE 40 MG/1
40 TABLET, DELAYED RELEASE ORAL
Qty: 90 | Refills: 3 | Status: ACTIVE
Start: 2025-01-02

## 2025-03-24 RX ORDER — SODIUM PICOSULFATE, MAGNESIUM OXIDE, AND ANHYDROUS CITRIC ACID 12; 3.5; 1 G/175ML; G/175ML; MG/175ML
LIQUID ORAL
Qty: 1 | Refills: 0 | Status: ACTIVE
Start: 2025-03-24

## 2025-03-24 NOTE — SERVICEHPINOTES
Coleen Torres   is a   74   year old  female   here today with history of gastroparesis historically well controlled on domperidone as well as history of fatty liver with previous FibroScan consistent with F2 who previously presented complaining of worsening indigestion, upper abdominal pain, nausea, and sour stomach getting worse over the past few months. 
shweta rock  She stated she has continued to take her domperidone faithfully. When her symptoms started initially she increased her dosage to 30 mg daily without much improvement. She states 1st thing in the morning she had a feeling of a sour stomach, indigestion, and upper abdominal pain and burning. She had taken Tums over-the-counter with some relief. She denied any significant dysphagia, coughing, hoarseness, sore throat, or vomiting.
shweta Umanzorter her last visit we did proceed with labs which were unremarkable, as well as an H. pylori breath test which was negative. We did also prescribe pantoprazole 40 mg daily, as well as ondansetron and Carafate as needed. We did also proceed with an abdominal ultrasound that showed hepatic cysts but was otherwise unremarkable. 
shweta rock Today she presents feeling very well.  She has been taking pantoprazole daily. She has also been eating smaller portions, and reports this combination has improved her symptoms significantly.  She denies any upper abdominal pain, burning, nausea, bloating, vomiting, fever, or dysphagia.  She does report she may have a sour feeling in her stomach and throat upon waking in the morning, but this resolves quickly as she takes her morning dose of medication. She denies any lower abdominal pain, change in bowel habits, or bleeding. Overall she reports feeling very well without any issues or complaints.Her last EGD was in 2021 and showed white exudate in the middle third of the esophagus and erythema in the antrum and stomach body compatible with gastritis. She completed treatment for candida without any issues.Kevin last colonoscopy was in 2021 and showed diverticulosis of the colon, multiple colon polyps, and internal hemorrhoids. She was placed on 3 year recall.

## 2025-03-24 NOTE — SERVICENOTES
She is feeling much improved since her last visit.  However, she is still having some breakthrough symptoms in the morning despite pantoprazole 40 mg daily and Pepcid twice daily.  We will proceed with EGD in addition to surveillance colonoscopy.  I did discuss with her and she is agreeable to proceed.  May consider further evaluation of her liver with repeat fibroscan at next OV. Advised to call with any new or worsening symptoms.  She verbalized understanding.

## 2025-05-20 ENCOUNTER — AMBULATORY SURGICAL CENTER (OUTPATIENT)
Dept: URBAN - METROPOLITAN AREA SURGERY 3 | Facility: SURGERY | Age: 75
End: 2025-05-20
Payer: MEDICARE

## 2025-05-20 VITALS
OXYGEN SATURATION: 96 % | SYSTOLIC BLOOD PRESSURE: 171 MMHG | DIASTOLIC BLOOD PRESSURE: 61 MMHG | SYSTOLIC BLOOD PRESSURE: 173 MMHG | RESPIRATION RATE: 23 BRPM | SYSTOLIC BLOOD PRESSURE: 173 MMHG | RESPIRATION RATE: 17 BRPM | RESPIRATION RATE: 23 BRPM | OXYGEN SATURATION: 99 % | RESPIRATION RATE: 12 BRPM | HEART RATE: 57 BPM | DIASTOLIC BLOOD PRESSURE: 73 MMHG | SYSTOLIC BLOOD PRESSURE: 152 MMHG | HEART RATE: 58 BPM | DIASTOLIC BLOOD PRESSURE: 67 MMHG | DIASTOLIC BLOOD PRESSURE: 74 MMHG | RESPIRATION RATE: 12 BRPM | HEART RATE: 54 BPM | WEIGHT: 244 LBS | DIASTOLIC BLOOD PRESSURE: 103 MMHG | DIASTOLIC BLOOD PRESSURE: 73 MMHG | TEMPERATURE: 97.5 F | HEART RATE: 58 BPM | OXYGEN SATURATION: 97 % | OXYGEN SATURATION: 99 % | RESPIRATION RATE: 16 BRPM | SYSTOLIC BLOOD PRESSURE: 151 MMHG | OXYGEN SATURATION: 98 % | HEIGHT: 67 IN | SYSTOLIC BLOOD PRESSURE: 171 MMHG | WEIGHT: 244 LBS | DIASTOLIC BLOOD PRESSURE: 67 MMHG | HEART RATE: 56 BPM | HEART RATE: 57 BPM | SYSTOLIC BLOOD PRESSURE: 123 MMHG | DIASTOLIC BLOOD PRESSURE: 61 MMHG | HEIGHT: 67 IN | TEMPERATURE: 97.5 F | RESPIRATION RATE: 16 BRPM | HEART RATE: 54 BPM | OXYGEN SATURATION: 98 % | OXYGEN SATURATION: 96 % | DIASTOLIC BLOOD PRESSURE: 74 MMHG | DIASTOLIC BLOOD PRESSURE: 103 MMHG | SYSTOLIC BLOOD PRESSURE: 152 MMHG | OXYGEN SATURATION: 97 % | HEART RATE: 56 BPM | SYSTOLIC BLOOD PRESSURE: 123 MMHG | RESPIRATION RATE: 17 BRPM | SYSTOLIC BLOOD PRESSURE: 151 MMHG

## 2025-05-20 DIAGNOSIS — Z09 ENCOUNTER FOR FOLLOW-UP EXAMINATION AFTER COMPLETED TREATMEN: ICD-10-CM

## 2025-05-20 DIAGNOSIS — B37.81 CANDIDAL ESOPHAGITIS: ICD-10-CM

## 2025-05-20 DIAGNOSIS — K57.30 DIVERTICULOSIS OF LARGE INTESTINE WITHOUT PERFORATION OR ABS: ICD-10-CM

## 2025-05-20 DIAGNOSIS — Z86.0101 PERSONAL HISTORY OF ADENOMATOUS AND SERRATED COLON POLYPS: ICD-10-CM

## 2025-05-20 DIAGNOSIS — K21.9 GASTRO-ESOPHAGEAL REFLUX DISEASE WITHOUT ESOPHAGITIS: ICD-10-CM

## 2025-05-20 DIAGNOSIS — D12.2 BENIGN NEOPLASM OF ASCENDING COLON: ICD-10-CM

## 2025-05-20 PROBLEM — Z86.010 SURVEILLANCE DUE TO PRIOR COLONIC NEOPLASIA: Status: ACTIVE | Noted: 2025-05-20

## 2025-05-20 PROCEDURE — 45380 COLONOSCOPY AND BIOPSY: CPT | Mod: PT | Performed by: INTERNAL MEDICINE

## 2025-05-20 PROCEDURE — 43239 EGD BIOPSY SINGLE/MULTIPLE: CPT | Mod: 51 | Performed by: INTERNAL MEDICINE

## 2025-05-23 LAB
GASTRO ONE PATHOLOGY: PDF REPORT: (no result)
GASTRO ONE PATHOLOGY: PDF REPORT: (no result)